# Patient Record
Sex: MALE | URBAN - METROPOLITAN AREA
[De-identification: names, ages, dates, MRNs, and addresses within clinical notes are randomized per-mention and may not be internally consistent; named-entity substitution may affect disease eponyms.]

---

## 2017-05-11 PROBLEM — J47.9 BRONCHIECTASIS WITHOUT COMPLICATION (HCC): Status: ACTIVE | Noted: 2017-05-11

## 2017-05-15 PROBLEM — Z96.651 HISTORY OF TOTAL RIGHT KNEE REPLACEMENT: Status: ACTIVE | Noted: 2017-05-15

## 2017-08-25 PROCEDURE — 82607 VITAMIN B-12: CPT | Performed by: INTERNAL MEDICINE

## 2018-03-12 PROBLEM — Z86.010 HX OF ADENOMATOUS COLONIC POLYPS: Status: ACTIVE | Noted: 2018-03-12

## 2018-03-12 PROBLEM — J45.909 ASTHMA, UNSPECIFIED ASTHMA SEVERITY, UNSPECIFIED WHETHER COMPLICATED, UNSPECIFIED WHETHER PERSISTENT (HCC): Status: ACTIVE | Noted: 2018-03-12

## 2018-03-12 PROBLEM — J45.909 ASTHMA, UNSPECIFIED ASTHMA SEVERITY, UNSPECIFIED WHETHER COMPLICATED, UNSPECIFIED WHETHER PERSISTENT: Status: ACTIVE | Noted: 2018-03-12

## 2018-03-12 PROBLEM — J30.9 ALLERGIC RHINITIS, UNSPECIFIED SEASONALITY, UNSPECIFIED TRIGGER: Status: ACTIVE | Noted: 2018-03-12

## 2018-03-12 PROBLEM — E66.9 OBESITY, UNSPECIFIED CLASSIFICATION, UNSPECIFIED OBESITY TYPE, UNSPECIFIED WHETHER SERIOUS COMORBIDITY PRESENT: Status: ACTIVE | Noted: 2018-03-12

## 2021-04-16 PROBLEM — Z96.651 HISTORY OF TOTAL RIGHT KNEE REPLACEMENT: Status: RESOLVED | Noted: 2017-05-15 | Resolved: 2021-04-16

## 2021-04-16 PROBLEM — Z86.010 HX OF ADENOMATOUS COLONIC POLYPS: Status: RESOLVED | Noted: 2018-03-12 | Resolved: 2021-04-16

## 2021-04-16 PROBLEM — J47.9 BRONCHIECTASIS WITHOUT COMPLICATION (HCC): Status: RESOLVED | Noted: 2017-05-11 | Resolved: 2021-04-16

## 2021-04-16 PROBLEM — J30.9 ALLERGIC RHINITIS, UNSPECIFIED SEASONALITY, UNSPECIFIED TRIGGER: Status: RESOLVED | Noted: 2018-03-12 | Resolved: 2021-04-16

## 2021-12-01 ENCOUNTER — TELEPHONE (OUTPATIENT)
Dept: CARDIOLOGY | Age: 76
End: 2021-12-01

## 2024-07-05 ENCOUNTER — HOSPITAL ENCOUNTER (OUTPATIENT)
Facility: HOSPITAL | Age: 79
Setting detail: OBSERVATION
LOS: 1 days | Discharge: HOME | End: 2024-07-06
Attending: EMERGENCY MEDICINE | Admitting: INTERNAL MEDICINE
Payer: MEDICARE

## 2024-07-05 DIAGNOSIS — K92.2 GASTROINTESTINAL HEMORRHAGE, UNSPECIFIED GASTROINTESTINAL HEMORRHAGE TYPE: Primary | ICD-10-CM

## 2024-07-05 PROBLEM — R79.89 AZOTEMIA: Status: ACTIVE | Noted: 2024-07-05

## 2024-07-05 LAB
ALBUMIN SERPL-MCNC: 3.5 G/DL (ref 3.4–5)
ALBUMIN/GLOB SERPL: 1 {RATIO} (ref 1–2)
ALP LIVER SERPL-CCNC: 91 U/L
ALT SERPL-CCNC: 32 U/L
ANION GAP SERPL CALC-SCNC: 8 MMOL/L (ref 0–18)
ANTIBODY SCREEN: NEGATIVE
APTT PPP: 26.9 SECONDS (ref 23–36)
AST SERPL-CCNC: 26 U/L (ref 15–37)
BASOPHILS # BLD AUTO: 0.06 X10(3) UL (ref 0–0.2)
BASOPHILS NFR BLD AUTO: 0.6 %
BILIRUB SERPL-MCNC: 0.6 MG/DL (ref 0.1–2)
BUN BLD-MCNC: 30 MG/DL (ref 9–23)
CALCIUM BLD-MCNC: 8.9 MG/DL (ref 8.5–10.1)
CHLORIDE SERPL-SCNC: 109 MMOL/L (ref 98–112)
CO2 SERPL-SCNC: 25 MMOL/L (ref 21–32)
CREAT BLD-MCNC: 1.41 MG/DL
EGFRCR SERPLBLD CKD-EPI 2021: 50 ML/MIN/1.73M2 (ref 60–?)
EOSINOPHIL # BLD AUTO: 0.31 X10(3) UL (ref 0–0.7)
EOSINOPHIL NFR BLD AUTO: 3.2 %
ERYTHROCYTE [DISTWIDTH] IN BLOOD BY AUTOMATED COUNT: 12.8 %
GLOBULIN PLAS-MCNC: 3.4 G/DL (ref 2.8–4.4)
GLUCOSE BLD-MCNC: 96 MG/DL (ref 70–99)
HCT VFR BLD AUTO: 38.6 %
HGB BLD-MCNC: 13 G/DL
IMM GRANULOCYTES # BLD AUTO: 0.06 X10(3) UL (ref 0–1)
IMM GRANULOCYTES NFR BLD: 0.6 %
INR BLD: 1.09 (ref 0.8–1.2)
LYMPHOCYTES # BLD AUTO: 1.53 X10(3) UL (ref 1–4)
LYMPHOCYTES NFR BLD AUTO: 15.7 %
MCH RBC QN AUTO: 29.5 PG (ref 26–34)
MCHC RBC AUTO-ENTMCNC: 33.7 G/DL (ref 31–37)
MCV RBC AUTO: 87.7 FL
MONOCYTES # BLD AUTO: 1.04 X10(3) UL (ref 0.1–1)
MONOCYTES NFR BLD AUTO: 10.7 %
NEUTROPHILS # BLD AUTO: 6.74 X10 (3) UL (ref 1.5–7.7)
NEUTROPHILS # BLD AUTO: 6.74 X10(3) UL (ref 1.5–7.7)
NEUTROPHILS NFR BLD AUTO: 69.2 %
OSMOLALITY SERPL CALC.SUM OF ELEC: 300 MOSM/KG (ref 275–295)
PLATELET # BLD AUTO: 236 10(3)UL (ref 150–450)
POTASSIUM SERPL-SCNC: 4.2 MMOL/L (ref 3.5–5.1)
PROT SERPL-MCNC: 6.9 G/DL (ref 6.4–8.2)
PROTHROMBIN TIME: 14.1 SECONDS (ref 11.6–14.8)
RBC # BLD AUTO: 4.4 X10(6)UL
RH BLOOD TYPE: POSITIVE
RH BLOOD TYPE: POSITIVE
SODIUM SERPL-SCNC: 142 MMOL/L (ref 136–145)
WBC # BLD AUTO: 9.7 X10(3) UL (ref 4–11)

## 2024-07-05 PROCEDURE — 99285 EMERGENCY DEPT VISIT HI MDM: CPT

## 2024-07-05 PROCEDURE — 86901 BLOOD TYPING SEROLOGIC RH(D): CPT | Performed by: EMERGENCY MEDICINE

## 2024-07-05 PROCEDURE — 85730 THROMBOPLASTIN TIME PARTIAL: CPT | Performed by: EMERGENCY MEDICINE

## 2024-07-05 PROCEDURE — 80053 COMPREHEN METABOLIC PANEL: CPT | Performed by: EMERGENCY MEDICINE

## 2024-07-05 PROCEDURE — 86900 BLOOD TYPING SEROLOGIC ABO: CPT | Performed by: EMERGENCY MEDICINE

## 2024-07-05 PROCEDURE — 96361 HYDRATE IV INFUSION ADD-ON: CPT

## 2024-07-05 PROCEDURE — 96360 HYDRATION IV INFUSION INIT: CPT

## 2024-07-05 PROCEDURE — 85610 PROTHROMBIN TIME: CPT | Performed by: EMERGENCY MEDICINE

## 2024-07-05 PROCEDURE — 86850 RBC ANTIBODY SCREEN: CPT | Performed by: EMERGENCY MEDICINE

## 2024-07-05 PROCEDURE — 85025 COMPLETE CBC W/AUTO DIFF WBC: CPT | Performed by: EMERGENCY MEDICINE

## 2024-07-05 RX ORDER — SENNOSIDES 8.6 MG
17.2 TABLET ORAL NIGHTLY PRN
Status: DISCONTINUED | OUTPATIENT
Start: 2024-07-05 | End: 2024-07-06

## 2024-07-05 RX ORDER — ACETAMINOPHEN 500 MG
500 TABLET ORAL EVERY 4 HOURS PRN
Status: DISCONTINUED | OUTPATIENT
Start: 2024-07-05 | End: 2024-07-06

## 2024-07-05 RX ORDER — ENEMA 19; 7 G/133ML; G/133ML
1 ENEMA RECTAL ONCE AS NEEDED
Status: DISCONTINUED | OUTPATIENT
Start: 2024-07-05 | End: 2024-07-06

## 2024-07-05 RX ORDER — MELATONIN
3 NIGHTLY PRN
Status: DISCONTINUED | OUTPATIENT
Start: 2024-07-05 | End: 2024-07-06

## 2024-07-05 RX ORDER — SOTALOL HYDROCHLORIDE 80 MG/1
80 TABLET ORAL 2 TIMES DAILY
COMMUNITY
Start: 2024-06-10

## 2024-07-05 RX ORDER — ATORVASTATIN CALCIUM 20 MG/1
20 TABLET, FILM COATED ORAL NIGHTLY
Status: DISCONTINUED | OUTPATIENT
Start: 2024-07-05 | End: 2024-07-06

## 2024-07-05 RX ORDER — FLUTICASONE PROPIONATE 50 MCG
1 SPRAY, SUSPENSION (ML) NASAL DAILY
Status: DISCONTINUED | OUTPATIENT
Start: 2024-07-05 | End: 2024-07-06

## 2024-07-05 RX ORDER — SOTALOL HYDROCHLORIDE 80 MG/1
80 TABLET ORAL 2 TIMES DAILY
Status: DISCONTINUED | OUTPATIENT
Start: 2024-07-05 | End: 2024-07-06

## 2024-07-05 RX ORDER — FUROSEMIDE 20 MG/1
20 TABLET ORAL DAILY
COMMUNITY
Start: 2023-05-27

## 2024-07-05 RX ORDER — CLOPIDOGREL BISULFATE 75 MG/1
75 TABLET ORAL DAILY
COMMUNITY
Start: 2023-08-11 | End: 2024-07-06

## 2024-07-05 RX ORDER — ONDANSETRON 2 MG/ML
4 INJECTION INTRAMUSCULAR; INTRAVENOUS EVERY 6 HOURS PRN
Status: DISCONTINUED | OUTPATIENT
Start: 2024-07-05 | End: 2024-07-06

## 2024-07-05 RX ORDER — ONDANSETRON 2 MG/ML
4 INJECTION INTRAMUSCULAR; INTRAVENOUS EVERY 4 HOURS PRN
Status: DISCONTINUED | OUTPATIENT
Start: 2024-07-05 | End: 2024-07-05

## 2024-07-05 RX ORDER — METOCLOPRAMIDE HYDROCHLORIDE 5 MG/ML
5 INJECTION INTRAMUSCULAR; INTRAVENOUS EVERY 8 HOURS PRN
Status: DISCONTINUED | OUTPATIENT
Start: 2024-07-05 | End: 2024-07-06

## 2024-07-05 RX ORDER — FLUTICASONE PROPIONATE 50 MCG
1 SPRAY, SUSPENSION (ML) NASAL DAILY
Status: DISCONTINUED | OUTPATIENT
Start: 2024-07-06 | End: 2024-07-05

## 2024-07-05 RX ORDER — BISACODYL 10 MG
10 SUPPOSITORY, RECTAL RECTAL
Status: DISCONTINUED | OUTPATIENT
Start: 2024-07-05 | End: 2024-07-06

## 2024-07-05 RX ORDER — FLUTICASONE FUROATE AND VILANTEROL 200; 25 UG/1; UG/1
1 POWDER RESPIRATORY (INHALATION) DAILY
Status: DISCONTINUED | OUTPATIENT
Start: 2024-07-05 | End: 2024-07-06

## 2024-07-05 RX ORDER — SODIUM CHLORIDE, SODIUM LACTATE, POTASSIUM CHLORIDE, CALCIUM CHLORIDE 600; 310; 30; 20 MG/100ML; MG/100ML; MG/100ML; MG/100ML
INJECTION, SOLUTION INTRAVENOUS CONTINUOUS
Status: DISCONTINUED | OUTPATIENT
Start: 2024-07-05 | End: 2024-07-06

## 2024-07-05 RX ORDER — PANTOPRAZOLE SODIUM 40 MG/1
40 TABLET, DELAYED RELEASE ORAL
Status: DISCONTINUED | OUTPATIENT
Start: 2024-07-05 | End: 2024-07-06

## 2024-07-05 RX ORDER — POLYETHYLENE GLYCOL 3350 17 G/17G
17 POWDER, FOR SOLUTION ORAL DAILY PRN
Status: DISCONTINUED | OUTPATIENT
Start: 2024-07-05 | End: 2024-07-06

## 2024-07-05 RX ORDER — FLUTICASONE FUROATE AND VILANTEROL 200; 25 UG/1; UG/1
1 POWDER RESPIRATORY (INHALATION) DAILY
Status: DISCONTINUED | OUTPATIENT
Start: 2024-07-06 | End: 2024-07-05

## 2024-07-05 RX ORDER — SODIUM CHLORIDE 9 MG/ML
125 INJECTION, SOLUTION INTRAVENOUS CONTINUOUS
Status: DISCONTINUED | OUTPATIENT
Start: 2024-07-05 | End: 2024-07-05

## 2024-07-05 RX ORDER — ASPIRIN 81 MG/1
81 TABLET ORAL DAILY
COMMUNITY
Start: 2023-05-24

## 2024-07-05 RX ORDER — ATORVASTATIN CALCIUM 20 MG/1
20 TABLET, FILM COATED ORAL NIGHTLY
COMMUNITY
Start: 2023-05-27

## 2024-07-05 RX ORDER — SODIUM CHLORIDE 9 MG/ML
INJECTION, SOLUTION INTRAVENOUS CONTINUOUS
Status: ACTIVE | OUTPATIENT
Start: 2024-07-05 | End: 2024-07-05

## 2024-07-05 NOTE — PLAN OF CARE
NURSING ADMISSION NOTE      Patient admitted via Cart  Oriented to room.  Safety precautions initiated.  Bed in low position.  Call light in reach.    Patient from home admitted with GI bleed x2 days. Denied any pain, light headedness, or dizziness. Admissions in navigator completed. IVF infusing per orders. Tolerating CLD. Call light within reach.

## 2024-07-05 NOTE — ED INITIAL ASSESSMENT (HPI)
Pt ambulatory to room C3 with complaints of 2 days bright red bloody stool with clots. On plavix. Denies sob, denies weakness or dizziness, denies abdominal pain. Hx hemorrhoids. No straining for bms.

## 2024-07-05 NOTE — ED QUICK NOTES
Orders for admission, patient is aware of plan and ready to go upstairs. Any questions, please call ED RN Brenda at extension [phone].     Patient Covid vaccination status: Fully vaccinated     COVID Test Ordered in ED: None    COVID Suspicion at Admission: N/A    Running Infusions:    sodium chloride 125 mL/hr (07/05/24 1327)        Mental Status/LOC at time of transport: A&O x 4, wife bedside. Fluids infusing at 125ml/hr per MAR. Ambulatory with steady gait, no assistive devices.    Other pertinent information:   CIWA score: N/A   NIH score:  N/A

## 2024-07-05 NOTE — H&P
[facility] Hospitalist History and Physical      Chief Complaint   Patient presents with    GI Bleeding        PCP: Ronak Alexis DO      History of Present Illness: Patient is a 78 year old male with PMH sig for CAD s/p stent (5/'23) on DAPT, hx VT, asthma, HTN, hx of diverticulosis p/w bloody stools. Symptoms started yesterday with signficant amount of blood in his stool. Had 4+ episodes of bloody Bms with the entire bowl full of blood (showed pictures from his phone). Denies chest pain, fevers or abdominal pain. Denies passing out or syncope. Last dose of aspirin this AM and last dose of plavix was Thursday PM.   In the ED VSS. Labs with Cr 1.4, Hgb 13. GI consulted, admitted for further evaluation and treatment.     Past Medical History:    ABPA (allergic bronchopulmonary aspergillosis) (HCC)    Hiren hernandez    Allergic rhinitis, unspecified seasonality, unspecified trigger    Asthma, unspecified asthma severity, unspecified whether complicated, unspecified whether persistent (HCC)    B12 deficiency    Diverticulosis of colon    bleeding    Essential hypertension    Hx of adenomatous colonic polyps    Lung nodule    Nasal septal deviation    Obesity, unspecified classification, unspecified obesity type, unspecified whether serious comorbidity present    PPD positive, treated    RBBB    S/P TKR (total knee replacement) using cement, right      Past Surgical History:   Procedure Laterality Date    Colonoscopy  2/23/16= Diverticulosis, Hemorrhoids    Repeat PRN    Colonoscopy N/A 2/23/2016    Procedure: COLONOSCOPY;  Surgeon: Jacobo Willett MD;  Location:  ENDOSCOPY    Colonoscopy,diagnostic  1992/2002/2005 2005 hyperplastic polyp    Colonoscopy,diagnostic  1/11    due 1/16    Knee surgery Right 11-9-16    TKA - Dr. Nara Hathaway cataract extracap,insert lens  1/17    left dr Lopez    Repair ing hernia,5+y/o,reducibl  x 6    bilateral inguinal and umbilical and vental        ALL:  Allergies   Allergen  Reactions    Biaxin [Clarithromycin]      Headache/  Zpak he did ok with 2009    Pcns [Penicillins]      Got open sores when taking it        No current outpatient medications on file.       Social History     Tobacco Use    Smoking status: Never    Smokeless tobacco: Never   Substance Use Topics    Alcohol use: No     Alcohol/week: 0.0 standard drinks of alcohol        Fam Hx  Family History   Problem Relation Age of Onset    Cancer Mother     Diabetes Mother        Review of Systems  Comprehensive ROS reviewed and negative except for what is stated in HPI.      OBJECTIVE:  /64 (BP Location: Left arm)   Pulse 65   Temp 98.9 °F (37.2 °C) (Oral)   Resp 18   Ht 5' 7\" (1.702 m)   Wt 193 lb (87.5 kg)   SpO2 96%   BMI 30.23 kg/m²   General:  Alert, no distress, appears stated age.    Head:  Normocephalic, without obvious abnormality, atraumatic.   Eyes:  Sclera anicteric, No conjunctival pallor, EOMs intact.    Nose: Nares normal. Septum midline. Mucosa normal. No drainage.   Throat: Lips, mucosa, and tongue normal. Teeth and gums normal.   Neck: Supple, symmetrical, trachea midline, no cervical or supraclavicular lymph adenopathy, thyroid: no enlargment/tenderness/nodules appreciated   Lungs:   Clear to auscultation bilaterally. Normal effort   Chest wall:  No tenderness or deformity.   Heart:  Regular rate and rhythm, S1, S2 normal, no murmur, rub or gallop appreciated   Abdomen:   Soft, non-tender. Bowel sounds normal. No masses,  No organomegaly. Non distended   Extremities: Extremities normal, atraumatic, no cyanosis or edema.   Skin: Skin color, texture, turgor normal. No rashes or lesions.    Neurologic: Normal strength, no focal deficit appreciated     Data Review:    LABS:   Lab Results   Component Value Date    WBC 9.7 07/05/2024    HGB 13.0 07/05/2024    HCT 38.6 07/05/2024    .0 07/05/2024    CREATSERUM 1.41 07/05/2024    BUN 30 07/05/2024     07/05/2024    K 4.2 07/05/2024      07/05/2024    CO2 25.0 07/05/2024    GLU 96 07/05/2024    CA 8.9 07/05/2024    ALB 3.5 07/05/2024    ALKPHO 91 07/05/2024    BILT 0.6 07/05/2024    TP 6.9 07/05/2024    AST 26 07/05/2024    ALT 32 07/05/2024    PTT 26.9 07/05/2024    INR 1.09 07/05/2024    PTP 14.1 07/05/2024       CXR: All imaging personally reviewed.      Radiology: No results found.     Assessment/Plan:     78 year old male with PMH sig for CAD s/p stent (5/'23) on DAPT, hx VT, asthma, HTN, hx of diverticulosis p/w bloody stools.     Melena, suspect LGIB, likely diverticular  - CLD, NPO after midnight  - isotonic fluids  - hold asa, plavix  - PPI BID  - GI o/c  - monitor for bloody stools    Acute renal injury  - prerenal from blood loss/hypovolemia and lasix/lisinopril   - cont istonic fluids  - repeat renal studies in AM  - hold PTA lasix, lisinopril    CAD s/p stent 5/'23  - cardiology note from care everywhere recommended DAPT for a year (should've discontinued plavix in May 2024)  - will hold asa, plavix for now  - can likely resume asa tomorrow if GIB resolves  - denies CP  - statin    Hx VT  - sotalol BID    Asthma  - home inhalers    Essential HTN  - hold lisinopril, lasix for soft BP's  - monitor    FEN: CLD, PT/OT  Proph: SCDs  Code status: Full code     Outpatient records or previous hospital records reviewed.   [facility] hospitalist to continue to follow patient while in house      Megha Baker MD  [facility]  Hospitalist  Message over [employer]  Pager: [phone]        **Certification      PHYSICIAN Certification of Need for Inpatient Hospitalization - Initial Certification    Patient will require inpatient services that will reasonably be expected to span two midnight's based on the clinical documentation in H+P.   Based on patients current state of illness, I anticipate that, after discharge, patient will require TBD.     No

## 2024-07-06 VITALS
HEIGHT: 67 IN | DIASTOLIC BLOOD PRESSURE: 59 MMHG | WEIGHT: 193 LBS | SYSTOLIC BLOOD PRESSURE: 120 MMHG | TEMPERATURE: 98 F | HEART RATE: 52 BPM | RESPIRATION RATE: 18 BRPM | OXYGEN SATURATION: 100 % | BODY MASS INDEX: 30.29 KG/M2

## 2024-07-06 LAB
ALBUMIN SERPL-MCNC: 3.2 G/DL (ref 3.4–5)
ALBUMIN/GLOB SERPL: 1 {RATIO} (ref 1–2)
ALP LIVER SERPL-CCNC: 78 U/L
ALT SERPL-CCNC: 27 U/L
ANION GAP SERPL CALC-SCNC: 4 MMOL/L (ref 0–18)
AST SERPL-CCNC: 22 U/L (ref 15–37)
BASOPHILS # BLD AUTO: 0.08 X10(3) UL (ref 0–0.2)
BASOPHILS NFR BLD AUTO: 1.1 %
BILIRUB SERPL-MCNC: 0.9 MG/DL (ref 0.1–2)
BUN BLD-MCNC: 22 MG/DL (ref 9–23)
CALCIUM BLD-MCNC: 9 MG/DL (ref 8.5–10.1)
CHLORIDE SERPL-SCNC: 111 MMOL/L (ref 98–112)
CO2 SERPL-SCNC: 26 MMOL/L (ref 21–32)
CREAT BLD-MCNC: 1.08 MG/DL
EGFRCR SERPLBLD CKD-EPI 2021: 69 ML/MIN/1.73M2 (ref 60–?)
EOSINOPHIL # BLD AUTO: 0.2 X10(3) UL (ref 0–0.7)
EOSINOPHIL NFR BLD AUTO: 2.7 %
ERYTHROCYTE [DISTWIDTH] IN BLOOD BY AUTOMATED COUNT: 12.7 %
GLOBULIN PLAS-MCNC: 3.2 G/DL (ref 2.8–4.4)
GLUCOSE BLD-MCNC: 96 MG/DL (ref 70–99)
HCT VFR BLD AUTO: 36.9 %
HGB BLD-MCNC: 12.2 G/DL
IMM GRANULOCYTES # BLD AUTO: 0.02 X10(3) UL (ref 0–1)
IMM GRANULOCYTES NFR BLD: 0.3 %
LYMPHOCYTES # BLD AUTO: 1.27 X10(3) UL (ref 1–4)
LYMPHOCYTES NFR BLD AUTO: 17.1 %
MAGNESIUM SERPL-MCNC: 2.1 MG/DL (ref 1.6–2.6)
MCH RBC QN AUTO: 29.3 PG (ref 26–34)
MCHC RBC AUTO-ENTMCNC: 33.1 G/DL (ref 31–37)
MCV RBC AUTO: 88.7 FL
MONOCYTES # BLD AUTO: 0.69 X10(3) UL (ref 0.1–1)
MONOCYTES NFR BLD AUTO: 9.3 %
NEUTROPHILS # BLD AUTO: 5.16 X10 (3) UL (ref 1.5–7.7)
NEUTROPHILS # BLD AUTO: 5.16 X10(3) UL (ref 1.5–7.7)
NEUTROPHILS NFR BLD AUTO: 69.5 %
OSMOLALITY SERPL CALC.SUM OF ELEC: 295 MOSM/KG (ref 275–295)
PLATELET # BLD AUTO: 208 10(3)UL (ref 150–450)
POTASSIUM SERPL-SCNC: 4 MMOL/L (ref 3.5–5.1)
PROT SERPL-MCNC: 6.4 G/DL (ref 6.4–8.2)
RBC # BLD AUTO: 4.16 X10(6)UL
SODIUM SERPL-SCNC: 141 MMOL/L (ref 136–145)
WBC # BLD AUTO: 7.4 X10(3) UL (ref 4–11)

## 2024-07-06 PROCEDURE — 83735 ASSAY OF MAGNESIUM: CPT | Performed by: HOSPITALIST

## 2024-07-06 PROCEDURE — 97161 PT EVAL LOW COMPLEX 20 MIN: CPT

## 2024-07-06 PROCEDURE — 85025 COMPLETE CBC W/AUTO DIFF WBC: CPT | Performed by: HOSPITALIST

## 2024-07-06 PROCEDURE — 96361 HYDRATE IV INFUSION ADD-ON: CPT

## 2024-07-06 PROCEDURE — 94640 AIRWAY INHALATION TREATMENT: CPT

## 2024-07-06 PROCEDURE — 80053 COMPREHEN METABOLIC PANEL: CPT | Performed by: HOSPITALIST

## 2024-07-06 PROCEDURE — 97530 THERAPEUTIC ACTIVITIES: CPT

## 2024-07-06 RX ORDER — ASPIRIN 81 MG/1
81 TABLET ORAL DAILY
Status: DISCONTINUED | OUTPATIENT
Start: 2024-07-06 | End: 2024-07-06

## 2024-07-06 NOTE — PHYSICAL THERAPY NOTE
PHYSICAL THERAPY EVALUATION - INPATIENT     Room Number: 424/424-A  Evaluation Date: 2024  Type of Evaluation: Initial  Physician Order: PT Eval and Treat    Presenting Problem: GI htemorrhage     Reason for Therapy: Mobility Dysfunction and Discharge Planning    PHYSICAL THERAPY ASSESSMENT   Patient is a 78 year old male admitted 2024 for GI hemorrhage.   Patient is currently functioning near baseline with bed mobility, transfers, and gait. Prior to admission, patient's baseline is independent.         PLAN  Patient currently does not meet criteria for skilled inpatient physical therapy services, however patient will remain on Inpatient Mobility Team and will continue with encouraged ambulation to maintain current level of mobility.   The rehab aide will perform treatment activities prescribed by this physical therapist. The rehab aide will communicate with overseeing PT regarding any change in functional mobility. RN aware.     GOALS  Patient was able to achieve the following goals ...    Patient was able to transfer Safely and independently   Patient able to ambulate on level surfaces Safely and independently         HOME SITUATION  Type of Home: House   Home Layout: Multi-level                      Patient Owned Equipment: None       Prior Level of [place]: Pt reports ind PTA.      SUBJECTIVE  \"I do pretty well!\"      OBJECTIVE  Precautions: Bed/chair alarm  Fall Risk: Standard fall risk    WEIGHT BEARING RESTRICTION                   PAIN ASSESSMENT  Ratin          COGNITION  Overall Cognitive Status:  WFL - within functional limits    RANGE OF MOTION AND STRENGTH ASSESSMENT  Upper extremity ROM and strength are within functional limits     Lower extremity ROM is within functional limits     Lower extremity strength is within functional limits       BALANCE  Static Sitting: Good  Dynamic Sitting: Good  Static Standing: Good  Dynamic Standing: Fair +    ADDITIONAL TESTS                                     ACTIVITY TOLERANCE                         O2 WALK       NEUROLOGICAL FINDINGS                        AM-PAC '6-Clicks' INPATIENT SHORT FORM - BASIC MOBILITY  How much difficulty does the patient currently have...  Patient Difficulty: Turning over in bed (including adjusting bedclothes, sheets and blankets)?: None   Patient Difficulty: Sitting down on and standing up from a chair with arms (e.g., wheelchair, bedside commode, etc.): None   Patient Difficulty: Moving from lying on back to sitting on the side of the bed?: None   How much help from another person does the patient currently need...   Help from Another: Moving to and from a bed to a chair (including a wheelchair)?: None   Help from Another: Need to walk in hospital room?: None   Help from Another: Climbing 3-5 steps with a railing?: None       AM-PAC Score:  Raw Score: 24   Approx Degree of Impairment: 0%   Standardized Score (AM-PAC Scale): 61.14   CMS Modifier (G-Code): CH    FUNCTIONAL ABILITY STATUS  Gait Assessment   Functional Mobility/Gait Assessment  Gait Assistance: Independent  Distance (ft): 150  Assistive Device: None  Pattern: Within Functional Limits (initially slowed murphy)    Skilled Therapy Provided     Bed Mobility:  Rolling: ind  Supine to sit: ind   Sit to supine: NT     Transfer Mobility:  Sit to stand: ind   Stand to sit: ind  Gait = ind as above.    Therapist's comments:Pt denies concerns re: stairs at this time.  Encouraged OOB to chair, ambulating in hallway 3x/day.  PCT aware.  Goal for 3 walks per day written on board with check boxes.  Pt aware to call for assist when mobilizing.    Exercise/Education Provided:  Bed mobility  Body mechanics  Functional activity tolerated  Gait training  Transfer training    Patient End of Session: Up in chair;Needs met;Call light within reach;RN aware of session/findings;All patient questions and concerns addressed    Patient Evaluation Complexity Level:  History Low - no  personal factors and/or co-morbidities   Examination of body systems Low - addressing 1-2 elements   Clinical Presentation Low - Stable   Clinical Decision Making Low Complexity       PT Session Time:  15 minutes    Therapeutic Activity: 8 minutes

## 2024-07-06 NOTE — PLAN OF CARE
Problem: GASTROINTESTINAL - ADULT  Goal: Maintains or returns to baseline bowel function  Description: INTERVENTIONS:  - Assess bowel function  - Maintain adequate hydration with IV or PO as ordered and tolerated  - Evaluate effectiveness of GI medications  - Encourage mobilization and activity  - Obtain nutritional consult as needed  - Establish a toileting routine/schedule  -Progressing:    Tolerating clear  liquid diet, denies abd pain, denies nausea, patient reported he passed like a pellet size of blood clots earlier of the shift, instructed  to let RN know for further recurrence of stools. IVfluids continued, up  with standby assist, denies dizziness, will cont to monitor.

## 2024-07-06 NOTE — PROGRESS NOTES
[facility] Emergency Department  Hospitalist Progress Note                                                                     [facility]   part of [facility]        Harish Carty  11/30/1945    SUBJECTIVE:  Patient seen and examined.   Only one episode of stool with minimal blood, none in the toilet but only when he wiped.  Denies CP/SOB.  NAD.       OBJECTIVE:  Temp:  [97.2 °F (36.2 °C)-98.9 °F (37.2 °C)] 97.8 °F (36.6 °C)  Pulse:  [46-72] 55  Resp:  [14-25] 16  BP: (106-146)/() 144/65  SpO2:  [93 %-98 %] 97 %  Exam  Gen: No acute distress, alert and oriented x3, no focal neurologic deficits  Pulm: Lungs clear bilaterally, normal respiratory effort  CV: Heart with regular rate and rhythm, no murmur.  Normal PMI.    Abd: Abdomen soft, nontender, nondistended, no organomegaly, bowel sounds present  MSK: Full range of motion in extremities, no clubbing, no cyanosis  Skin: no rashes or lesions    Labs:   Recent Labs   Lab 07/05/24  1311 07/06/24  0811   WBC 9.7 7.4   HGB 13.0 12.2*   MCV 87.7 88.7   .0 208.0   INR 1.09  --        Recent Labs   Lab 07/05/24  1311 07/06/24  0811    141   K 4.2 4.0    111   CO2 25.0 26.0   BUN 30* 22   CREATSERUM 1.41* 1.08   CA 8.9 9.0   MG  --  2.1   GLU 96 96       Recent Labs   Lab 07/05/24  1311 07/06/24  0811   ALT 32 27   AST 26 22   ALB 3.5 3.2*       No results for input(s): \"PGLU\" in the last 168 hours.    Meds:   Scheduled:    aspirin  81 mg Oral Daily    atorvastatin  20 mg Oral Nightly    sotalol  80 mg Oral BID    fluticasone propionate  1 spray Each Nare Daily    fluticasone furoate-vilanterol  1 puff Inhalation Daily    pantoprazole  40 mg Oral BID AC     Continuous Infusions:    lactated ringers 125 mL/hr at 07/06/24 0527     PRN:   acetaminophen    melatonin    polyethylene glycol (PEG 3350)    sennosides    bisacodyl    fleet enema    ondansetron    metoclopramide    Assessment/Plan:  Principal  Problem:    Gastrointestinal hemorrhage, unspecified gastrointestinal hemorrhage type  Active Problems:    Azotemia    78 year old male with PMH sig for CAD s/p stent (5/'23) on DAPT, hx VT, asthma, HTN, hx of diverticulosis p/w bloody stools.      Melena, suspect LGIB, likely diverticular  - CLD, ADAT per GI   - isotonic fluids - can decrease  - hold asa, plavix - will restart aspirin, may need to hold plavix on dc and f/u with OP cardiologist   - PPI BID  - GI o/c  - monitor for bloody stools     Acute renal injury - resolved  - prerenal from blood loss/hypovolemia and lasix/lisinopril   - cont istonic fluids  - repeat renal studies in AM  - hold PTA lasix, lisinopril - resume     CAD s/p stent 5/'23  - cardiology note from care everywhere recommended DAPT for a year (should've discontinued plavix in May 2024)  - will hold plavix for now  - resume asa   - denies CP  - statin  - f/u cardiology as OP to discuss plavix      Hx VT  - sotalol BID     Asthma  - home inhalers     Essential HTN  - hold lisinopril, lasix for soft BP's  - monitor     FEN: CLD, PT/OT  Proph: SCDs  Code status: Full code     Dispo - possible dc today if cleared by GI and no further interventions, otherwise monitor another night for further bleeding, await GI recs     Megha Baker MD  [facility]  Message over [facility]/[employer]  Pager: [phone]

## 2024-07-06 NOTE — CONSULTS
[facility] IP Report of Consultation Gastroenterology    7/6/2024    Harish PELON srinivasanfamilia  male   Miguel Monterroso MD     [id number]  11/30/1945 Primary Care Physician  Ronak Alexis DO     Reason for Consultation: brbpr    79 yo F with history of CAD s/p stent May 2023 on aspirin and plavix here for brbpr since Thursday morning. Six episodes of brbpr with clots. No diarrhea or abdominal pain preceding this. No chest pain, sob, lightheadedness, or dizziness.     Colonoscopy 2016 with diverticulosis.    Only one episode small brbpr since admission.     Hgb baseline 15, now 12.2.    PROBLEM LIST:     Patient Active Problem List   Diagnosis    Vitamin D deficiency    Essential hypertension    Asthma, unspecified asthma severity, unspecified whether complicated, unspecified whether persistent (HCC)    Obesity, unspecified classification, unspecified obesity type, unspecified whether serious comorbidity present    Gastrointestinal hemorrhage, unspecified gastrointestinal hemorrhage type    Azotemia       PATIENT HISTORY:     Past Medical History:    ABPA (allergic bronchopulmonary aspergillosis) (HCC)    Hiren hernandez    Allergic rhinitis, unspecified seasonality, unspecified trigger    Asthma, unspecified asthma severity, unspecified whether complicated, unspecified whether persistent (HCC)    B12 deficiency    Diverticulosis of colon    bleeding    Essential hypertension    Hx of adenomatous colonic polyps    Lung nodule    Nasal septal deviation    Obesity, unspecified classification, unspecified obesity type, unspecified whether serious comorbidity present    PPD positive, treated    RBBB    S/P TKR (total knee replacement) using cement, right      Past Surgical History:   Procedure Laterality Date    Colonoscopy  2/23/16= Diverticulosis, Hemorrhoids    Repeat PRN    Colonoscopy N/A 2/23/2016    Procedure: COLONOSCOPY;  Surgeon: Jacobo Willett MD;  Location:  ENDOSCOPY    Colonoscopy,diagnostic  1992/2002/2005     2005 hyperplastic polyp    Colonoscopy,diagnostic  1/11    due 1/16    Knee surgery Right 11-9-16    TKA - Dr. Nara Hathaway cataract extracap,insert lens  1/17    left dr Lopez    Repair ing hernia,5+y/o,reducibl  x 6    bilateral inguinal and umbilical and vental      Family History   Problem Relation Age of Onset    Cancer Mother     Diabetes Mother       Social History     Socioeconomic History    Marital status:      Spouse name: Dulce    Number of children: 2    Years of education: 16   Occupational History    Occupation: retired from    Tobacco Use    Smoking status: Never    Smokeless tobacco: Never   Vaping Use    Vaping status: Never Used   Substance and Sexual Activity    Alcohol use: No     Alcohol/week: 0.0 standard drinks of alcohol    Drug use: No    Sexual activity: Yes     Partners: Female   Social History Narrative    Lives in [facility] in [place]     since 1987    Oldest child is Gabriela Cheatham born in 1970-previous marriage    Youngest child is Pari born in 1980-previous marriage     Social Determinants of Health     Food Insecurity: No Food Insecurity (7/5/2024)    Food Insecurity     Food Insecurity: Never true   Transportation Needs: No Transportation Needs (7/5/2024)    Transportation Needs     Lack of Transportation: No   Housing Stability: Low Risk  (7/5/2024)    Housing Stability     Housing Instability: No        Allergies;  Allergies   Allergen Reactions    Biaxin [Clarithromycin]      Headache/  Zpak he did ok with 2009    Pcns [Penicillins]      Got open sores when taking it        Medications:    Current Facility-Administered Medications:     aspirin DR tab 81 mg, 81 mg, Oral, Daily    atorvastatin (Lipitor) tab 20 mg, 20 mg, Oral, Nightly    acetaminophen (Tylenol Extra Strength) tab 500 mg, 500 mg, Oral, Q4H PRN    melatonin tab 3 mg, 3 mg, Oral, Nightly PRN    polyethylene glycol (PEG 3350) (Miralax) 17 g oral packet 17 g, 17 g, Oral, Daily PRN    sennosides  (Senokot) tab 17.2 mg, 17.2 mg, Oral, Nightly PRN    bisacodyl (Dulcolax) 10 MG rectal suppository 10 mg, 10 mg, Rectal, Daily PRN    fleet enema (Fleet) 7-19 GM/118ML rectal enema 133 mL, 1 enema, Rectal, Once PRN    ondansetron (Zofran) 4 MG/2ML injection 4 mg, 4 mg, Intravenous, Q6H PRN    metoclopramide (Reglan) 5 mg/mL injection 5 mg, 5 mg, Intravenous, Q8H PRN    lactated ringers infusion, , Intravenous, Continuous    sotalol (Betapace) tab 80 mg, 80 mg, Oral, BID    fluticasone propionate (Flonase) 50 MCG/ACT nasal suspension 1 spray, 1 spray, Each Nare, Daily    fluticasone furoate-vilanterol (Breo Ellipta) 200-25 MCG/ACT inhaler 1 puff, 1 puff, Inhalation, Daily    pantoprazole (Protonix) DR tab 40 mg, 40 mg, Oral, BID AC     REVIEW OF SYSTEMS:   10 point ros reviewed. Pertinent positive per HPI.      EXAM:   /65 (BP Location: Right arm)   Pulse 55   Temp 97.8 °F (36.6 °C) (Oral)   Resp 16   Ht 5' 7\" (1.702 m)   Wt 193 lb (87.5 kg)   SpO2 97%   BMI 30.23 kg/m²  Body mass index is 30.23 kg/m².  GENERAL: Well developed, well nourished, in no obvious distress.   CV: RRR  PULM: CTAB  ABDOMEN: Bowel sounds normoactive. Soft, no organomegaly or masses appreciated. Nontender.   EXTREMITIES: Without cyanosis. No peripheral edema appreciated.   NEURO: Alert and Oriented x 3.        LAB/IMAGING RESULTS:     Lab Results   Component Value Date    WBC 7.4 07/06/2024    HGB 12.2 07/06/2024    HCT 36.9 07/06/2024    .0 07/06/2024       Lab Results   Component Value Date    WBC 7.4 07/06/2024    HGB 12.2 07/06/2024    HCT 36.9 07/06/2024    .0 07/06/2024    CREATSERUM 1.08 07/06/2024    BUN 22 07/06/2024     07/06/2024    K 4.0 07/06/2024     07/06/2024    CO2 26.0 07/06/2024    GLU 96 07/06/2024    CA 9.0 07/06/2024    ALB 3.2 07/06/2024    ALKPHO 78 07/06/2024    BILT 0.9 07/06/2024    TP 6.4 07/06/2024    AST 22 07/06/2024    ALT 27 07/06/2024    PTT 26.9 07/05/2024    INR 1.09  2024    PTP 14.1 2024    MG 2.1 2024  [facility]           Harish Carty Patient Status:  Hospital Outpatient Surgery    1945 MRN [id number]   Location [facility] ENDOSCOPY Attending Jacobo Willett MD   Hosp Day # 0 PCP Faustino Garcia MD            PATIENT NAME: Harish Carty  DATE OF OPERATION: 2016     PREOPERATIVE DIAGNOSIS:  CRC screening, remote polyp history  POSTOPERATIVE DIAGNOSIS:  Diverticulosis.  Internal hemorrhoids  ASSESSMENT AND PLAN:   GI bleed    BRBPR with decrease in hgb. No pain. Colonoscopy . C/w resolved diverticular bleed.    - discharge on high fiber diet  - 1 tbsp citrucel fiber powder daily  - f/u with pcp in 1 week  - discharge on aspirin 81 mg daily, instructed patient to contact cardiologist and if has to resume plavix then ok to resume plavix Tuesday    The patient indicates understanding of these issues and agrees to the plan.  Miguel Monterroso MD  2024  11:29 AM

## 2024-07-06 NOTE — CM/SW NOTE
Patient failed inpatient criteria. Second level of review completed and supports observation. UR committee in agreement. Discussed with Dr. Baker who approves observation status. Observation letter given to the patient and order written.